# Patient Record
Sex: FEMALE | Race: WHITE | HISPANIC OR LATINO | URBAN - METROPOLITAN AREA
[De-identification: names, ages, dates, MRNs, and addresses within clinical notes are randomized per-mention and may not be internally consistent; named-entity substitution may affect disease eponyms.]

---

## 2017-01-11 ENCOUNTER — OUTPATIENT (OUTPATIENT)
Dept: OUTPATIENT SERVICES | Facility: HOSPITAL | Age: 57
LOS: 1 days | End: 2017-01-11
Payer: COMMERCIAL

## 2017-01-11 ENCOUNTER — APPOINTMENT (OUTPATIENT)
Dept: RADIOLOGY | Facility: IMAGING CENTER | Age: 57
End: 2017-01-11

## 2017-01-11 DIAGNOSIS — N95.1 MENOPAUSAL AND FEMALE CLIMACTERIC STATES: ICD-10-CM

## 2017-01-11 PROCEDURE — 77080 DXA BONE DENSITY AXIAL: CPT

## 2018-09-11 ENCOUNTER — RECORD ABSTRACTING (OUTPATIENT)
Age: 58
End: 2018-09-11

## 2018-09-14 ENCOUNTER — TRANSCRIPTION ENCOUNTER (OUTPATIENT)
Age: 58
End: 2018-09-14

## 2018-11-12 ENCOUNTER — APPOINTMENT (OUTPATIENT)
Dept: PULMONOLOGY | Facility: CLINIC | Age: 58
End: 2018-11-12
Payer: COMMERCIAL

## 2018-11-12 VITALS
OXYGEN SATURATION: 100 % | HEIGHT: 62 IN | SYSTOLIC BLOOD PRESSURE: 120 MMHG | RESPIRATION RATE: 16 BRPM | HEART RATE: 69 BPM | DIASTOLIC BLOOD PRESSURE: 81 MMHG | BODY MASS INDEX: 30.91 KG/M2 | WEIGHT: 168 LBS

## 2018-11-12 LAB — POCT - HEMOGLOBIN (HGB), QUANTITATIVE, TRANSCUTANEOUS: 14.5

## 2018-11-12 PROCEDURE — 71046 X-RAY EXAM CHEST 2 VIEWS: CPT

## 2018-11-12 PROCEDURE — 94060 EVALUATION OF WHEEZING: CPT

## 2018-11-12 PROCEDURE — 99214 OFFICE O/P EST MOD 30 MIN: CPT | Mod: 25

## 2018-11-12 PROCEDURE — 94750: CPT

## 2018-11-12 PROCEDURE — 94726 PLETHYSMOGRAPHY LUNG VOLUMES: CPT

## 2018-11-12 PROCEDURE — 94664 DEMO&/EVAL PT USE INHALER: CPT | Mod: 59

## 2018-11-12 PROCEDURE — 88738 HGB QUANT TRANSCUTANEOUS: CPT

## 2018-11-12 PROCEDURE — 94729 DIFFUSING CAPACITY: CPT

## 2019-05-21 ENCOUNTER — APPOINTMENT (OUTPATIENT)
Dept: PULMONOLOGY | Facility: CLINIC | Age: 59
End: 2019-05-21

## 2019-11-11 ENCOUNTER — APPOINTMENT (OUTPATIENT)
Dept: PULMONOLOGY | Facility: CLINIC | Age: 59
End: 2019-11-11
Payer: COMMERCIAL

## 2019-11-11 VITALS
BODY MASS INDEX: 32.2 KG/M2 | DIASTOLIC BLOOD PRESSURE: 83 MMHG | WEIGHT: 175 LBS | RESPIRATION RATE: 14 BRPM | HEART RATE: 81 BPM | SYSTOLIC BLOOD PRESSURE: 122 MMHG | OXYGEN SATURATION: 96 % | HEIGHT: 62 IN

## 2019-11-11 DIAGNOSIS — Z87.09 PERSONAL HISTORY OF OTHER DISEASES OF THE RESPIRATORY SYSTEM: ICD-10-CM

## 2019-11-11 DIAGNOSIS — Z23 ENCOUNTER FOR IMMUNIZATION: ICD-10-CM

## 2019-11-11 DIAGNOSIS — K21.9 GASTRO-ESOPHAGEAL REFLUX DISEASE W/OUT ESOPHAGITIS: ICD-10-CM

## 2019-11-11 LAB — POCT - HEMOGLOBIN (HGB), QUANTITATIVE, TRANSCUTANEOUS: 15.3

## 2019-11-11 PROCEDURE — G0008: CPT

## 2019-11-11 PROCEDURE — 94060 EVALUATION OF WHEEZING: CPT

## 2019-11-11 PROCEDURE — 90674 CCIIV4 VAC NO PRSV 0.5 ML IM: CPT

## 2019-11-11 PROCEDURE — 94729 DIFFUSING CAPACITY: CPT

## 2019-11-11 PROCEDURE — 94726 PLETHYSMOGRAPHY LUNG VOLUMES: CPT

## 2019-11-11 PROCEDURE — ZZZZZ: CPT

## 2019-11-11 PROCEDURE — 71046 X-RAY EXAM CHEST 2 VIEWS: CPT

## 2019-11-11 PROCEDURE — 99214 OFFICE O/P EST MOD 30 MIN: CPT | Mod: 25

## 2019-11-11 PROCEDURE — 94664 DEMO&/EVAL PT USE INHALER: CPT | Mod: 59

## 2019-11-11 PROCEDURE — 88738 HGB QUANT TRANSCUTANEOUS: CPT

## 2019-11-11 PROCEDURE — 94750: CPT

## 2019-11-11 NOTE — PROCEDURE
[FreeTextEntry1] : 11/11/19\par PA and lateral chest radiograph reveals a normal heart and mediastinum. Bony structures are intact. Lung fields are clear bilaterally. There is no significant pleural disease. \par \par No change compared to 11/12/18\par \par PFT attached relatively stable function.\par FEV1, FVC, and FEV1/FVC are within normal limits. There was not a significant response to inhaled bronchodilator. TLC and subdivisions are normal. RV/TLC ratio is normal. Resistance and specific conductance are normal. Single breath diffusion capacity is normal. \par PFT attached relatively stable function.\par \par

## 2019-11-11 NOTE — HISTORY OF PRESENT ILLNESS
[FreeTextEntry1] : No change PM Hx or meds.\par \par Difficulty with sore throat greater than 1 month.\par Also intermittent dry cough\par \par Denies derm, ocular or rheumatic complaints.

## 2019-11-11 NOTE — CONSULT LETTER
[Dear  ___] : Dear ~VIPUL, [Consult Letter:] : I had the pleasure of evaluating your patient, [unfilled]. [Please see my note below.] : Please see my note below. [Consult Closing:] : Thank you very much for allowing me to participate in the care of this patient.  If you have any questions, please do not hesitate to contact me. [Sincerely,] : Sincerely, [FreeTextEntry2] : Kenna Wilson MD\par  [FreeTextEntry3] : Micha Srinivasan MD FCCP\par

## 2019-11-11 NOTE — ASSESSMENT
[FreeTextEntry1] : Trial of Omeprazole for 2 weeks for sore throat occ. dry cough. Will call re response.\par \par PT clinically and radiographically stable. Continue observation re: sarcoidosis\par

## 2019-11-11 NOTE — PHYSICAL EXAM
[General Appearance - Well Developed] : well developed [General Appearance - Well Nourished] : well nourished [Heart Sounds] : normal S1 and S2 [Jugular Venous Distention Increased] : there was no jugular-venous distention [] : the neck was supple [Auscultation Breath Sounds / Voice Sounds] : lungs were clear to auscultation bilaterally [Murmurs] : no murmurs present [Lungs Percussion] : the lungs were normal to percussion [Abdomen Tenderness] : non-tender [Abdomen Soft] : soft [Abdomen Mass (___ Cm)] : no abdominal mass palpated [Nail Clubbing] : no clubbing of the fingernails [Cyanosis, Localized] : no localized cyanosis

## 2020-11-09 DIAGNOSIS — Z20.828 CONTACT WITH AND (SUSPECTED) EXPOSURE TO OTHER VIRAL COMMUNICABLE DISEASES: ICD-10-CM

## 2020-11-16 ENCOUNTER — APPOINTMENT (OUTPATIENT)
Dept: PULMONOLOGY | Facility: CLINIC | Age: 60
End: 2020-11-16

## 2020-12-21 PROBLEM — Z87.09 HISTORY OF PHARYNGITIS: Status: RESOLVED | Noted: 2019-11-11 | Resolved: 2020-12-21

## 2021-09-11 ENCOUNTER — APPOINTMENT (OUTPATIENT)
Dept: OTOLARYNGOLOGY | Facility: CLINIC | Age: 61
End: 2021-09-11
Payer: COMMERCIAL

## 2021-09-11 VITALS
SYSTOLIC BLOOD PRESSURE: 117 MMHG | TEMPERATURE: 97.9 F | HEIGHT: 61.81 IN | BODY MASS INDEX: 32.76 KG/M2 | DIASTOLIC BLOOD PRESSURE: 78 MMHG | HEART RATE: 92 BPM | WEIGHT: 178.04 LBS

## 2021-09-11 DIAGNOSIS — H93.8X3 OTHER SPECIFIED DISORDERS OF EAR, BILATERAL: ICD-10-CM

## 2021-09-11 DIAGNOSIS — H61.23 IMPACTED CERUMEN, BILATERAL: ICD-10-CM

## 2021-09-11 PROCEDURE — 92504 EAR MICROSCOPY EXAMINATION: CPT

## 2021-09-11 PROCEDURE — 99202 OFFICE O/P NEW SF 15 MIN: CPT | Mod: 25

## 2021-09-14 PROBLEM — H93.8X3 EAR FULLNESS, BILATERAL: Status: ACTIVE | Noted: 2021-09-14

## 2021-09-14 PROBLEM — H61.23 EXCESSIVE CERUMEN IN BOTH EAR CANALS: Status: ACTIVE | Noted: 2021-09-14

## 2021-09-14 NOTE — REASON FOR VISIT
[Initial Consultation] : an initial consultation for [FreeTextEntry2] : bilateral ear clogged. [FreeTextEntry1] : Kenna Mitchell

## 2021-09-14 NOTE — PROCEDURE
[Cerumen Impaction] : Cerumen Impaction [Same] : same as the Pre Op Dx. [] : Removal of Cerumen [FreeTextEntry1] : ear fullness [FreeTextEntry4] : none [FreeTextEntry6] : Cerumen was removed under binocular microscopy with a combination of a suction, dolan needle, alligator and/or a loop curette. The patient tolerated the procedure well and there were no complications. The included findings were noted.\par

## 2021-09-14 NOTE — HISTORY OF PRESENT ILLNESS
[de-identified] : 62yo woman with sunshine ear fullness\par has h/o cerumen\par normally gets cleared every year, but skipped last year due to pandemic\par no otalgia or otorrhea\par planned air travel\par

## 2021-09-14 NOTE — PHYSICAL EXAM
[Binocular Microscopic Exam] : Binocular microscopic exam was performed [Hearing Loss Right Only] : normal [Hearing Loss Left Only] : normal [FreeTextEntry8] : cerumen, removed [FreeTextEntry9] : cerumen, removed [Midline] : trachea located in midline position [Normal] : no rashes

## 2021-09-21 DIAGNOSIS — Z01.818 ENCOUNTER FOR OTHER PREPROCEDURAL EXAMINATION: ICD-10-CM

## 2021-09-22 ENCOUNTER — NON-APPOINTMENT (OUTPATIENT)
Age: 61
End: 2021-09-22

## 2021-09-22 ENCOUNTER — APPOINTMENT (OUTPATIENT)
Dept: DISASTER EMERGENCY | Facility: CLINIC | Age: 61
End: 2021-09-22

## 2021-09-22 LAB — SARS-COV-2 N GENE NPH QL NAA+PROBE: NOT DETECTED

## 2021-09-27 ENCOUNTER — APPOINTMENT (OUTPATIENT)
Dept: PULMONOLOGY | Facility: CLINIC | Age: 61
End: 2021-09-27
Payer: COMMERCIAL

## 2021-09-27 VITALS
HEART RATE: 82 BPM | DIASTOLIC BLOOD PRESSURE: 81 MMHG | SYSTOLIC BLOOD PRESSURE: 121 MMHG | TEMPERATURE: 97.9 F | OXYGEN SATURATION: 99 %

## 2021-09-27 DIAGNOSIS — R05 COUGH: ICD-10-CM

## 2021-09-27 DIAGNOSIS — R06.83 SNORING: ICD-10-CM

## 2021-09-27 LAB — POCT - HEMOGLOBIN (HGB), QUANTITATIVE, TRANSCUTANEOUS: 16.5

## 2021-09-27 PROCEDURE — ZZZZZ: CPT

## 2021-09-27 PROCEDURE — 99214 OFFICE O/P EST MOD 30 MIN: CPT | Mod: 25

## 2021-09-27 PROCEDURE — 94727 GAS DIL/WSHOT DETER LNG VOL: CPT

## 2021-09-27 PROCEDURE — 88738 HGB QUANT TRANSCUTANEOUS: CPT

## 2021-09-27 PROCEDURE — 94010 BREATHING CAPACITY TEST: CPT

## 2021-09-27 PROCEDURE — 71046 X-RAY EXAM CHEST 2 VIEWS: CPT

## 2021-09-27 PROCEDURE — 94729 DIFFUSING CAPACITY: CPT

## 2021-09-27 NOTE — PROCEDURE
[FreeTextEntry1] : 09/27/2021\par Pulmonary function testing\par Normal Flow Rates There is evidence of mild hyperinflation. There is a mild diffusion impairment. \par PFT attached relatively stable function.\par

## 2021-09-27 NOTE — PHYSICAL EXAM
[Supple] : supple [No JVD] : no jvd [Normal S1, S2] : normal s1, s2 [No Murmurs] : no murmurs [Clear to Auscultation Bilaterally] : clear to auscultation bilaterally [Normal to Percussion] : normal to percussion [No Abnormalities] : no abnormalities [Benign] : benign [No HSM] : no hsm [No Clubbing] : no clubbing [No Cyanosis] : no cyanosis [No Edema] : no edema

## 2021-09-27 NOTE — ASSESSMENT
[FreeTextEntry1] : For HSS\par F/U PMD re: ? polycythemia. Has appt. tomorrow.  Will get labs.\par \par \par PT clinically and radiographically stable. Continue observation re: sarcoidosis\par

## 2021-09-27 NOTE — HISTORY OF PRESENT ILLNESS
[Fatigue] : fatigue [Nonrestorative Sleep] : nonrestorative sleep [Snoring] : snoring [TextBox_4] : Cough responded to Omeprazole\par \par No significant cough, wheezing, chest pain or SOB.\par \par Optho Due\par  [Awakes Unrefreshed] : does not awaken unrefreshed [Recent  Weight Gain] : no recent weight gain [Witnessed Apneas] : no witnessed apneas

## 2021-10-06 ENCOUNTER — APPOINTMENT (OUTPATIENT)
Dept: PULMONOLOGY | Facility: CLINIC | Age: 61
End: 2021-10-06
Payer: COMMERCIAL

## 2021-10-06 PROCEDURE — 95800 SLP STDY UNATTENDED: CPT

## 2021-10-07 PROCEDURE — 95800 SLP STDY UNATTENDED: CPT

## 2021-12-03 ENCOUNTER — APPOINTMENT (OUTPATIENT)
Dept: PULMONOLOGY | Facility: CLINIC | Age: 61
End: 2021-12-03
Payer: COMMERCIAL

## 2021-12-03 VITALS
DIASTOLIC BLOOD PRESSURE: 74 MMHG | OXYGEN SATURATION: 100 % | HEART RATE: 91 BPM | SYSTOLIC BLOOD PRESSURE: 123 MMHG | TEMPERATURE: 97.6 F

## 2021-12-03 PROCEDURE — 99213 OFFICE O/P EST LOW 20 MIN: CPT

## 2021-12-03 NOTE — HISTORY OF PRESENT ILLNESS
[Fatigue] : fatigue [Nonrestorative Sleep] : nonrestorative sleep [Snoring] : snoring [TextBox_4] : Cough responded to Omeprazole\par \par No significant cough, wheezing, chest pain or SOB.\par \par Status post home sleep study.\par  [Awakes Unrefreshed] : does not awaken unrefreshed [Recent  Weight Gain] : no recent weight gain [Witnessed Apneas] : no witnessed apneas

## 2021-12-03 NOTE — PROCEDURE
[FreeTextEntry1] : 09/27/2021\par Pulmonary function testing\par Normal Flow Rates There is evidence of mild hyperinflation. There is a mild diffusion impairment. \par PFT attached relatively stable function.\par \par \par Home sleep study reviewed and reviewed with patient.\par AHI is 8 and RDI 27.  Minimal desaturation.

## 2021-12-03 NOTE — ASSESSMENT
[FreeTextEntry1] : Options discussed for treatment of mild to moderate sleep apnea with patient.  Risks and benefits discussed.  Options including weight loss, CPAP therapy, mandibular advancement device.  Likely not a candidate for inspire.\par Pt desires trial of wt. loss.\par F/U 4 months\par If not successful with weight loss and symptoms persist will give trial of CPAP.\par \par \par PT clinically and radiographically stable. Continue observation re: sarcoidosis\par

## 2021-12-03 NOTE — DISCUSSION/SUMMARY
[FreeTextEntry1] : Sarcoidosis stable\par Cough resolved with treatment for laryngeal pharyngeal reflux.\par Mild-mod CHEL

## 2022-04-08 ENCOUNTER — APPOINTMENT (OUTPATIENT)
Dept: PULMONOLOGY | Facility: CLINIC | Age: 62
End: 2022-04-08
Payer: COMMERCIAL

## 2022-04-08 VITALS
HEART RATE: 81 BPM | DIASTOLIC BLOOD PRESSURE: 76 MMHG | OXYGEN SATURATION: 96 % | TEMPERATURE: 98.2 F | SYSTOLIC BLOOD PRESSURE: 133 MMHG

## 2022-04-08 PROCEDURE — 99213 OFFICE O/P EST LOW 20 MIN: CPT | Mod: 25

## 2022-04-08 PROCEDURE — 90677 PCV20 VACCINE IM: CPT

## 2022-04-08 PROCEDURE — G0009: CPT

## 2022-04-08 NOTE — ASSESSMENT
[FreeTextEntry1] : Options discussed for treatment of mild to moderate sleep apnea with patient.  Risks and benefits discussed.  Options including weight loss, CPAP therapy, mandibular advancement device.  Likely not a candidate for inspire.\par Pt desires trial of MAD\par \par \par PT clinically and radiographically stable. Continue observation re: sarcoidosis\par

## 2022-05-06 ENCOUNTER — APPOINTMENT (OUTPATIENT)
Dept: PULMONOLOGY | Facility: CLINIC | Age: 62
End: 2022-05-06
Payer: COMMERCIAL

## 2022-05-06 VITALS
WEIGHT: 174 LBS | HEART RATE: 76 BPM | BODY MASS INDEX: 32.02 KG/M2 | DIASTOLIC BLOOD PRESSURE: 78 MMHG | HEIGHT: 62 IN | OXYGEN SATURATION: 98 % | SYSTOLIC BLOOD PRESSURE: 116 MMHG | TEMPERATURE: 97.6 F

## 2022-05-06 PROCEDURE — 88738 HGB QUANT TRANSCUTANEOUS: CPT

## 2022-05-06 PROCEDURE — 94727 GAS DIL/WSHOT DETER LNG VOL: CPT

## 2022-05-06 PROCEDURE — 94010 BREATHING CAPACITY TEST: CPT

## 2022-05-06 PROCEDURE — 99214 OFFICE O/P EST MOD 30 MIN: CPT | Mod: 25

## 2022-05-06 PROCEDURE — 94729 DIFFUSING CAPACITY: CPT

## 2022-05-07 LAB — POCT - HEMOGLOBIN (HGB), QUANTITATIVE, TRANSCUTANEOUS: 14.6

## 2022-05-07 NOTE — ASSESSMENT
[FreeTextEntry1] : Follow clinical and chest radiograph.  Follow-up in 3 months.\par Obtain MRI report.  Reviewed on phone.\par \par Pulmonary status maximized. Pulmonary function adequate to tolerate proposed endoscopic procedure. \par No pulmonary contraindications to procedure.\par \par Pt with CHEL. Anesthesia should be aware of CHEL and take CHEL precautions.\par \par \par 35 minutes spent in evaluation management and review of studies.\par \par

## 2022-05-07 NOTE — PROCEDURE
[FreeTextEntry1] : 05/06/2022\par Pulmonary function testing\par FEV1, FVC, and FEV1/FVC are within normal limits. TLC and subdivisions are increased. RV/TLC ratio is normal. Single breath diffusion capacity is normal. \par \par Home sleep study.\par AHI is 8 and RDI 27.  Minimal desaturation.

## 2022-05-07 NOTE — CONSULT LETTER
[Dear  ___] : Dear ~VIPUL, [Consult Letter:] : I had the pleasure of evaluating your patient, [unfilled]. [Please see my note below.] : Please see my note below. [Consult Closing:] : Thank you very much for allowing me to participate in the care of this patient.  If you have any questions, please do not hesitate to contact me. [Sincerely,] : Sincerely, [FreeTextEntry2] : Kayden Gutierrez MD\par  [FreeTextEntry3] : Micha Srinivasan MD FCCP\par

## 2022-05-07 NOTE — DISCUSSION/SUMMARY
[FreeTextEntry1] : Sarcoidosis stable\par Cough resolved with treatment for laryngeal pharyngeal reflux.\par Mild-mod CHEL\par Reported minimal pleural effusion on right likely not of major significance.  Not functionally significant at this time.  Based on report does not seem amenable to intervention.  We will follow-up.\par Could be related to possible prior ascites or sarcoid.  No evidence of congestive heart failure.  Other etiologies including neoplastic processes less likely.

## 2022-05-07 NOTE — HISTORY OF PRESENT ILLNESS
[Fatigue] : fatigue [Nonrestorative Sleep] : nonrestorative sleep [Snoring] : snoring [TextBox_4] : \par \par No significant cough, wheezing, chest pain or SOB.\par Had a sono told liver abn. and ascites\par Then had MRI told no ascites, fatty liver and trace right pleural effusion.\par No recent chest CT.\par For endoscopy and colonoscopy.  [Awakes Unrefreshed] : does not awaken unrefreshed [Recent  Weight Gain] : no recent weight gain [Witnessed Apneas] : no witnessed apneas

## 2022-08-12 ENCOUNTER — APPOINTMENT (OUTPATIENT)
Dept: PULMONOLOGY | Facility: CLINIC | Age: 62
End: 2022-08-12

## 2022-08-12 VITALS
WEIGHT: 174 LBS | OXYGEN SATURATION: 96 % | TEMPERATURE: 97.4 F | HEART RATE: 92 BPM | HEIGHT: 62 IN | DIASTOLIC BLOOD PRESSURE: 68 MMHG | SYSTOLIC BLOOD PRESSURE: 108 MMHG | BODY MASS INDEX: 32.02 KG/M2

## 2022-08-12 PROCEDURE — 99213 OFFICE O/P EST LOW 20 MIN: CPT | Mod: 25

## 2022-08-12 PROCEDURE — 71046 X-RAY EXAM CHEST 2 VIEWS: CPT

## 2022-08-13 NOTE — DISCUSSION/SUMMARY
[FreeTextEntry1] : Sarcoidosis stable\par Cough resolved with treatment for laryngeal pharyngeal reflux.\par Mild-mod CHEL\par Reported minimal pleural effusion on right likely not of major significance.  No evidence of significant pleural effusion on chest radiograph.  No evidence of progression.  Will follow.

## 2022-08-13 NOTE — HISTORY OF PRESENT ILLNESS
[Fatigue] : fatigue [Nonrestorative Sleep] : nonrestorative sleep [Snoring] : snoring [TextBox_4] : Completed GI workup dx. fatty liver not cirrhosis. \par Had endoscopy and colonoscopy fine.\par No significant cough, wheezing, chest pain or SOB.\par \par \par  [Awakes Unrefreshed] : does not awaken unrefreshed [Recent  Weight Gain] : no recent weight gain [Witnessed Apneas] : no witnessed apneas

## 2022-08-13 NOTE — ASSESSMENT
[FreeTextEntry1] : Continue observation.\par Patient has follow-up appointment for reevaluation regarding sarcoidosis.\par CT if evidence of increased effusion.\par \par 25 minutes spent in evaluation management and review of studies.\par \par

## 2022-10-07 ENCOUNTER — APPOINTMENT (OUTPATIENT)
Dept: PULMONOLOGY | Facility: CLINIC | Age: 62
End: 2022-10-07

## 2022-10-07 LAB — POCT - HEMOGLOBIN (HGB), QUANTITATIVE, TRANSCUTANEOUS: 14.3

## 2022-10-07 PROCEDURE — 94727 GAS DIL/WSHOT DETER LNG VOL: CPT

## 2022-10-07 PROCEDURE — 94729 DIFFUSING CAPACITY: CPT

## 2022-10-07 PROCEDURE — ZZZZZ: CPT

## 2022-10-07 PROCEDURE — 99213 OFFICE O/P EST LOW 20 MIN: CPT | Mod: 25

## 2022-10-07 PROCEDURE — 88738 HGB QUANT TRANSCUTANEOUS: CPT

## 2022-10-07 PROCEDURE — 94010 BREATHING CAPACITY TEST: CPT

## 2022-10-07 NOTE — PROCEDURE
[FreeTextEntry1] : 10/07/2022\par Pulmonary function testing\par Normal Flow Rates Normal Lung Volumes. There is a borderline mild diffusion impairment. \par Very mild decrement in function compared to May 2022.\par \par Home sleep study.\par AHI is 8 and RDI 27.  Minimal desaturation.

## 2022-10-07 NOTE — HISTORY OF PRESENT ILLNESS
[Fatigue] : fatigue [Nonrestorative Sleep] : nonrestorative sleep [Snoring] : snoring [TextBox_4] : Feeling well\par \par No significant cough, wheezing, chest pain or SOB.\par \par \par  [Awakes Unrefreshed] : does not awaken unrefreshed [Recent  Weight Gain] : no recent weight gain [Witnessed Apneas] : no witnessed apneas

## 2022-10-07 NOTE — DISCUSSION/SUMMARY
[FreeTextEntry1] : Sarcoidosis stable\par Cough resolved with treatment for laryngeal pharyngeal reflux.\par Mild-mod CHEL\par Reported minimal pleural effusion on right likely not of major significance.  No evidence of significant pleural effusion on recent chest radiograph.  No evidence of progression.  Will follow.

## 2022-10-07 NOTE — ASSESSMENT
[FreeTextEntry1] : To get Flu vaccine.\par F/U 6 months with CXR.\par F/U sooner PRN\par 25 minutes spent in evaluation management and review of studies.\par \par

## 2022-10-07 NOTE — REASON FOR VISIT
[Follow-Up] : a follow-up visit [Sleep Apnea] : sleep apnea [Sarcoidosis] : sarcoidosis [TextBox_44] : Pleural effusion

## 2022-10-07 NOTE — CONSULT LETTER
[Dear  ___] : Dear ~VIPUL, [Consult Letter:] : I had the pleasure of evaluating your patient, [unfilled]. [Please see my note below.] : Please see my note below. [Consult Closing:] : Thank you very much for allowing me to participate in the care of this patient.  If you have any questions, please do not hesitate to contact me. [Sincerely,] : Sincerely, [FreeTextEntry3] : Micha Srinivasan MD FCCP\par

## 2022-11-01 ENCOUNTER — APPOINTMENT (OUTPATIENT)
Dept: OTOLARYNGOLOGY | Facility: CLINIC | Age: 62
End: 2022-11-01

## 2023-04-07 ENCOUNTER — APPOINTMENT (OUTPATIENT)
Dept: PULMONOLOGY | Facility: CLINIC | Age: 63
End: 2023-04-07

## 2023-06-23 ENCOUNTER — APPOINTMENT (OUTPATIENT)
Dept: PULMONOLOGY | Facility: CLINIC | Age: 63
End: 2023-06-23

## 2023-07-07 ENCOUNTER — APPOINTMENT (OUTPATIENT)
Dept: PULMONOLOGY | Facility: CLINIC | Age: 63
End: 2023-07-07
Payer: COMMERCIAL

## 2023-07-07 VITALS — SYSTOLIC BLOOD PRESSURE: 108 MMHG | DIASTOLIC BLOOD PRESSURE: 74 MMHG | HEART RATE: 85 BPM | OXYGEN SATURATION: 98 %

## 2023-07-07 DIAGNOSIS — R07.89 OTHER CHEST PAIN: ICD-10-CM

## 2023-07-07 DIAGNOSIS — J90 PLEURAL EFFUSION, NOT ELSEWHERE CLASSIFIED: ICD-10-CM

## 2023-07-07 LAB — POCT - HEMOGLOBIN (HGB), QUANTITATIVE, TRANSCUTANEOUS: 16

## 2023-07-07 PROCEDURE — 71046 X-RAY EXAM CHEST 2 VIEWS: CPT

## 2023-07-07 PROCEDURE — 88738 HGB QUANT TRANSCUTANEOUS: CPT

## 2023-07-07 PROCEDURE — 94726 PLETHYSMOGRAPHY LUNG VOLUMES: CPT

## 2023-07-07 PROCEDURE — 94729 DIFFUSING CAPACITY: CPT

## 2023-07-07 PROCEDURE — ZZZZZ: CPT

## 2023-07-07 PROCEDURE — 94010 BREATHING CAPACITY TEST: CPT

## 2023-07-07 PROCEDURE — 99214 OFFICE O/P EST MOD 30 MIN: CPT | Mod: 25

## 2023-07-25 ENCOUNTER — NON-APPOINTMENT (OUTPATIENT)
Age: 63
End: 2023-07-25

## 2023-08-17 NOTE — DISCUSSION/SUMMARY
[FreeTextEntry1] : Sarcoidosis stable Cough resolved with treatment for laryngeal pharyngeal reflux. Mild-mod CHEL Reported minimal pleural effusion on right likely not of major significance.  No evidence of significant pleural effusion on  chest radiograph.  No evidence of progression.  Atypical chest pain.  Never smoker

## 2023-08-17 NOTE — PROCEDURE
[FreeTextEntry1] : 07/07/2023\par Pulmonary function testing\par FEV1, FVC, and FEV1/FVC are within normal limits. TLC and subdivisions are normal. RV/TLC ratio is normal. Resistance and specific conductance are normal. Single breath diffusion capacity is normal. \par PFT attached relatively stable function.\par \par \par \par \par Home sleep study.\par AHI is 8 and RDI 27.  Minimal desaturation.

## 2023-08-17 NOTE — HISTORY OF PRESENT ILLNESS
[Fatigue] : fatigue [Nonrestorative Sleep] : nonrestorative sleep [Snoring] : snoring [TextBox_4] : Feeling well\par Here for PFT\par No significant cough, wheezing, chest pain or SOB.\par Occ episode of chest pains resolves. Unclear if exertional. ? GI. \par \par  [Awakes Unrefreshed] : does not awaken unrefreshed [Recent  Weight Gain] : no recent weight gain [Witnessed Apneas] : no witnessed apneas

## 2023-08-17 NOTE — ASSESSMENT
[FreeTextEntry1] : F/U 1 year\par CT Chest\par F/U IM and consider cardiology.\par F/U sooner PRN\par \par 35 minutes spent in evaluation management and review of studies.\par \par

## 2023-11-13 ENCOUNTER — APPOINTMENT (OUTPATIENT)
Dept: PULMONOLOGY | Facility: CLINIC | Age: 63
End: 2023-11-13
Payer: COMMERCIAL

## 2023-11-13 VITALS — DIASTOLIC BLOOD PRESSURE: 77 MMHG | SYSTOLIC BLOOD PRESSURE: 123 MMHG | HEART RATE: 84 BPM | OXYGEN SATURATION: 99 %

## 2023-11-13 DIAGNOSIS — G47.33 OBSTRUCTIVE SLEEP APNEA (ADULT) (PEDIATRIC): ICD-10-CM

## 2023-11-13 DIAGNOSIS — D86.9 SARCOIDOSIS, UNSPECIFIED: ICD-10-CM

## 2023-11-13 DIAGNOSIS — R91.8 OTHER NONSPECIFIC ABNORMAL FINDING OF LUNG FIELD: ICD-10-CM

## 2023-11-13 DIAGNOSIS — Z01.811 ENCOUNTER FOR PREPROCEDURAL RESPIRATORY EXAMINATION: ICD-10-CM

## 2023-11-13 PROCEDURE — ZZZZZ: CPT

## 2023-11-13 PROCEDURE — 94729 DIFFUSING CAPACITY: CPT

## 2023-11-13 PROCEDURE — 88738 HGB QUANT TRANSCUTANEOUS: CPT

## 2023-11-13 PROCEDURE — 94727 GAS DIL/WSHOT DETER LNG VOL: CPT

## 2023-11-13 PROCEDURE — 99214 OFFICE O/P EST MOD 30 MIN: CPT | Mod: 25

## 2023-11-13 PROCEDURE — 94010 BREATHING CAPACITY TEST: CPT

## 2023-11-13 RX ORDER — OMEPRAZOLE 40 MG/1
40 CAPSULE, DELAYED RELEASE ORAL
Qty: 30 | Refills: 2 | Status: DISCONTINUED | COMMUNITY
Start: 2019-11-11 | End: 2023-11-13

## 2023-11-14 LAB — POCT - HEMOGLOBIN (HGB), QUANTITATIVE, TRANSCUTANEOUS: 14

## 2023-11-30 ENCOUNTER — APPOINTMENT (OUTPATIENT)
Dept: OBGYN | Facility: CLINIC | Age: 63
End: 2023-11-30
Payer: COMMERCIAL

## 2023-11-30 VITALS — BODY MASS INDEX: 33.13 KG/M2 | WEIGHT: 180 LBS | HEIGHT: 62 IN

## 2023-11-30 VITALS
WEIGHT: 180 LBS | BODY MASS INDEX: 33.13 KG/M2 | DIASTOLIC BLOOD PRESSURE: 85 MMHG | SYSTOLIC BLOOD PRESSURE: 135 MMHG | HEIGHT: 62 IN | HEART RATE: 72 BPM

## 2023-11-30 DIAGNOSIS — Z12.39 ENCOUNTER FOR OTHER SCREENING FOR MALIGNANT NEOPLASM OF BREAST: ICD-10-CM

## 2023-11-30 DIAGNOSIS — Z01.419 ENCOUNTER FOR GYNECOLOGICAL EXAMINATION (GENERAL) (ROUTINE) W/OUT ABNORMAL FINDINGS: ICD-10-CM

## 2023-11-30 DIAGNOSIS — R92.30 INCONCLUSIVE MAMMOGRAM: ICD-10-CM

## 2023-11-30 DIAGNOSIS — K14.8 OTHER DISEASES OF TONGUE: ICD-10-CM

## 2023-11-30 DIAGNOSIS — R92.2 INCONCLUSIVE MAMMOGRAM: ICD-10-CM

## 2023-11-30 DIAGNOSIS — Z78.9 OTHER SPECIFIED HEALTH STATUS: ICD-10-CM

## 2023-11-30 DIAGNOSIS — L29.2 PRURITUS VULVAE: ICD-10-CM

## 2023-11-30 PROCEDURE — 99386 PREV VISIT NEW AGE 40-64: CPT

## 2023-11-30 PROCEDURE — 99213 OFFICE O/P EST LOW 20 MIN: CPT | Mod: 25

## 2023-11-30 RX ORDER — NYSTATIN AND TRIAMCINOLONE ACETONIDE 100000; 1 [USP'U]/G; MG/G
100000-0.1 OINTMENT TOPICAL
Qty: 1 | Refills: 2 | Status: ACTIVE | COMMUNITY
Start: 2023-11-30 | End: 1900-01-01

## 2023-11-30 RX ORDER — ESTRADIOL 0.1 MG/G
0.1 CREAM VAGINAL
Refills: 0 | Status: COMPLETED | COMMUNITY
Start: 2018-11-12 | End: 2023-11-30

## 2023-12-02 PROBLEM — K14.8 TONGUE MASS: Status: ACTIVE | Noted: 2023-11-30

## 2023-12-02 LAB — HPV HIGH+LOW RISK DNA PNL CVX: NOT DETECTED

## 2023-12-02 RX ORDER — TAFLUPROST 0.01 MG/ML
0.01 SOLUTION/ DROPS OPHTHALMIC
Refills: 0 | Status: ACTIVE | COMMUNITY

## 2023-12-04 LAB — CYTOLOGY CVX/VAG DOC THIN PREP: NORMAL

## 2024-01-10 ENCOUNTER — APPOINTMENT (OUTPATIENT)
Dept: OBGYN | Facility: CLINIC | Age: 64
End: 2024-01-10
Payer: COMMERCIAL

## 2024-01-10 VITALS
HEART RATE: 96 BPM | SYSTOLIC BLOOD PRESSURE: 141 MMHG | DIASTOLIC BLOOD PRESSURE: 84 MMHG | BODY MASS INDEX: 32.94 KG/M2 | WEIGHT: 179 LBS | HEIGHT: 62 IN

## 2024-01-10 DIAGNOSIS — L29.2 PRURITUS VULVAE: ICD-10-CM

## 2024-01-10 DIAGNOSIS — N90.89 OTHER SPECIFIED NONINFLAMMATORY DISORDERS OF VULVA AND PERINEUM: ICD-10-CM

## 2024-01-10 PROCEDURE — 56821 COLPOSCOPY VULVA W/BIOPSY: CPT

## 2024-01-10 PROCEDURE — 99213 OFFICE O/P EST LOW 20 MIN: CPT | Mod: 25

## 2024-01-14 NOTE — PHYSICAL EXAM
[Chaperone Present] : A chaperone was present in the examining room during all aspects of the physical examination [Appropriately responsive] : appropriately responsive [Alert] : alert [Labia Majora] : normal [Labia Minora] : normal [Normal] : normal [FreeTextEntry1] : at the perineum excoriation and on the left introital region --raised grey lesion approx 1 cm

## 2024-01-14 NOTE — PROCEDURE
[Colposcopy] : Colposcopy  [Time out performed] : Pre-procedure time out performed.  Patient's name, date of birth and procedure confirmed. [Risks] : risks [Consent Obtained] : Consent obtained [Benefits] : benefits [Alternatives] : alternatives [Patient] : patient [Infection] : infection [Bleeding] : bleeding [Allergic Reaction] : allergic reaction [Lesion] : lesion seen [Biopsy] : biopsy taken [Hemostasis Obtained] : Hemostasis obtained [Tolerated Well] : the patient tolerated the procedure well [de-identified] : vulvar lesion [de-identified] : 8mm raised gray lesionon left vulva [de-identified] : lidocaine palced after betadine placed--key punch used [de-identified] : silver nitrate/monsels

## 2024-01-25 LAB — CORE LAB BIOPSY: NORMAL

## 2024-02-13 ENCOUNTER — NON-APPOINTMENT (OUTPATIENT)
Age: 64
End: 2024-02-13

## 2024-08-02 ENCOUNTER — APPOINTMENT (OUTPATIENT)
Dept: PULMONOLOGY | Facility: CLINIC | Age: 64
End: 2024-08-02

## 2024-08-30 ENCOUNTER — APPOINTMENT (OUTPATIENT)
Dept: PULMONOLOGY | Facility: CLINIC | Age: 64
End: 2024-08-30

## 2024-08-30 VITALS
SYSTOLIC BLOOD PRESSURE: 131 MMHG | HEART RATE: 84 BPM | OXYGEN SATURATION: 97 % | RESPIRATION RATE: 16 BRPM | DIASTOLIC BLOOD PRESSURE: 82 MMHG

## 2024-08-30 DIAGNOSIS — R91.8 OTHER NONSPECIFIC ABNORMAL FINDING OF LUNG FIELD: ICD-10-CM

## 2024-08-30 DIAGNOSIS — D86.9 SARCOIDOSIS, UNSPECIFIED: ICD-10-CM

## 2024-08-30 DIAGNOSIS — G47.33 OBSTRUCTIVE SLEEP APNEA (ADULT) (PEDIATRIC): ICD-10-CM

## 2024-08-30 LAB — POCT - HEMOGLOBIN (HGB), QUANTITATIVE, TRANSCUTANEOUS: 16.2

## 2024-08-30 PROCEDURE — ZZZZZ: CPT

## 2024-08-30 PROCEDURE — 71046 X-RAY EXAM CHEST 2 VIEWS: CPT

## 2024-08-30 PROCEDURE — 94727 GAS DIL/WSHOT DETER LNG VOL: CPT

## 2024-08-30 PROCEDURE — 99203 OFFICE O/P NEW LOW 30 MIN: CPT | Mod: 25

## 2024-08-30 PROCEDURE — 94010 BREATHING CAPACITY TEST: CPT

## 2024-08-30 PROCEDURE — 88738 HGB QUANT TRANSCUTANEOUS: CPT

## 2024-08-30 PROCEDURE — 94729 DIFFUSING CAPACITY: CPT

## 2024-08-30 NOTE — ASSESSMENT
[FreeTextEntry1] : Continue observation. Follow-up in 1 year or sooner on a as needed basis. No indication for follow-up CT unless change in clinical status.    35 minutes spent in evaluation management and review of studies.

## 2024-08-30 NOTE — PROCEDURE
[FreeTextEntry1] : 08/30/2024 Pulmonary function testing Normal Flow Rates Normal Lung Volumes. There is a mild diffusion impairment.    Ct chest 7/14/23 Few small pulmonary nodules.  No significant adenopathy. Mild biapical pleural scarring.  No acute infiltrates.  Resolved trace pleural effusion.    Home sleep study. AHI is 8 and RDI 27.  Minimal desaturation.

## 2024-08-30 NOTE — HISTORY OF PRESENT ILLNESS
[Never] : never [Fatigue] : fatigue [Nonrestorative Sleep] : nonrestorative sleep [Snoring] : snoring [TextBox_4] : Had negative bx of tonsil has f/u appt No significant cough, wheezing, chest pain or SOB. No recent illness. Feeling generally well.     [Awakes Unrefreshed] : does not awaken unrefreshed [Recent  Weight Gain] : no recent weight gain [Witnessed Apneas] : no witnessed apneas

## 2024-08-30 NOTE — DISCUSSION/SUMMARY
[FreeTextEntry1] : Sarcoidosis clinically functionally and radiographically stable. Mild-mod CHEL not on tx. attempting weight loss. Reported minimal pleural effusion on right resolved. Pulmonary nodules.  Never smoker

## 2024-10-29 ENCOUNTER — TRANSCRIPTION ENCOUNTER (OUTPATIENT)
Age: 64
End: 2024-10-29

## 2025-01-26 ENCOUNTER — NON-APPOINTMENT (OUTPATIENT)
Age: 65
End: 2025-01-26

## 2025-06-13 ENCOUNTER — APPOINTMENT (OUTPATIENT)
Dept: PULMONOLOGY | Facility: CLINIC | Age: 65
End: 2025-06-13

## 2025-06-16 ENCOUNTER — APPOINTMENT (OUTPATIENT)
Dept: PULMONOLOGY | Facility: CLINIC | Age: 65
End: 2025-06-16
Payer: MEDICARE

## 2025-06-16 VITALS
SYSTOLIC BLOOD PRESSURE: 113 MMHG | DIASTOLIC BLOOD PRESSURE: 76 MMHG | BODY MASS INDEX: 33.47 KG/M2 | HEART RATE: 77 BPM | OXYGEN SATURATION: 96 % | WEIGHT: 183 LBS

## 2025-06-16 LAB — POCT - HEMOGLOBIN (HGB), QUANTITATIVE, TRANSCUTANEOUS: 13.5

## 2025-06-16 PROCEDURE — 94729 DIFFUSING CAPACITY: CPT

## 2025-06-16 PROCEDURE — ZZZZZ: CPT

## 2025-06-16 PROCEDURE — 94010 BREATHING CAPACITY TEST: CPT

## 2025-06-16 PROCEDURE — 94727 GAS DIL/WSHOT DETER LNG VOL: CPT

## 2025-06-16 PROCEDURE — 88738 HGB QUANT TRANSCUTANEOUS: CPT

## 2025-06-16 PROCEDURE — 99214 OFFICE O/P EST MOD 30 MIN: CPT | Mod: 25

## 2025-07-04 ENCOUNTER — NON-APPOINTMENT (OUTPATIENT)
Age: 65
End: 2025-07-04

## 2025-09-16 ENCOUNTER — NON-APPOINTMENT (OUTPATIENT)
Age: 65
End: 2025-09-16

## 2025-09-17 ENCOUNTER — APPOINTMENT (OUTPATIENT)
Dept: OBGYN | Facility: CLINIC | Age: 65
End: 2025-09-17